# Patient Record
Sex: MALE | Race: WHITE | Employment: FULL TIME | URBAN - METROPOLITAN AREA
[De-identification: names, ages, dates, MRNs, and addresses within clinical notes are randomized per-mention and may not be internally consistent; named-entity substitution may affect disease eponyms.]

---

## 2022-08-22 NOTE — PROGRESS NOTES
Neurology Note    Patient ID:  Jarett Murillo  [id number]  64 y.o.  1960      Date of Consultation:  August 23, 2022    Referring Physician: Dr. Tony Patton    Reason for Consultation: Numbness and weakness      Assessment and Plan:      The patient is a pleasant 44-year-old gentleman who presents to the neurology clinic with sensory disturbance in his distal lower extremity. His examination does reveal evidence of a mild length dependent peripheral neuropathy. Peripheral neuropathy:  The differential includes idiopathic, inflammatory, infectious, metabolic. This very well may be a predominant small fiber neuropathy. I was able to review basic laboratory results performed at [facility]. I have reached out to his primary care doctor to see what blood tests have been obtained within the last 6 months and whether additional testing is necessary. Laboratory results which will need to be tested including updated glucose screen, vitamin B12, serum and fixation. He does report to having elevated cholesterol in the past.  We talked about the role of dyslipidemia can pay him neuropathic symptoms. He will follow-up with his primary care doctor in regards to this    I will obtain an EMG/nerve conduction study to determine the severity of the neuropathy and nerve type involved. We did discuss his role as a  and potential exposure to different types of solvents. He reports a low level of exposure . Will follow clinically for the time being. Neuropathy:  we reviewed the causes contributing to the neuropathy. We discussed the importance of diet, exercise and activity. Neuropathic pain:  He continues with gabapentin at bedtime which seems to be helping his symptoms      Prior lyme disease: previously treated with antibiotics.            Subjective: I have numbness in my feet       History of Present Illness:   Jarett Murillo is a 64 y.o. male who was referred to the neurology clinic at [facility] for an evaluation. He was referred due to numbness in his feet and to a lesser extent his hands. She presents with his wife today who provides additional information. The patient stated that he began to notice symptoms in his feet around mid June 2022. He states that it started in both feet. It was slightly worse on the left. He did notice mild swelling of his left foot. This was just on the left and was not noted on the right. He did ultimately have an ultrasound done which revealed no evidence of a blood clot. He describes his symptoms as both a combination of tingling and numbness in his feet. It has been fairly constant since then. He ultimately needed to go to [facility] on June 28 due to symptoms persisting. It was at that time they started him on low-dose gabapentin 300 mg at bedtime. This has made him gradually feel better. He has no more tingling. He still does have the numbness and the coldness in his feet. He does not notice any balance difficulties. Prior to the onset of these symptoms, There was no new medications that were started. He does work as a  and has not had any specific chemical exposure but did want to mention this. He also has not had COVID but is concerned about possible post long-haul COVID symptoms and could that be associated with his sensory symptoms in his feet. We discussed the unlikeliness of the visit as there was no clear evidence of COVID. The patient did bring lab results from mid-to-late June at both [facility] and [facility].   The CBC was normal.  LFTs normal.  Creatinine normal he also did have a chest x-ray which was normal.  TSH was normal.    The patient does state he has had high cholesterol in the past but does not remember what his level was. He also reports that he has had Lyme disease a couple times and has taken antibiotics.     Other items that him or his wife wanted to mention that occasionally he does have some mild tremor in his hands. Is most notable when he is carrying something heavy but is not a concern for him. He also does have a longstanding history of chronic headaches and will get a rare migraine. If he takes ibuprofen and lays down this typically helps it go away. Past Medical History:   Diagnosis Date    Hx of appendectomy     2010    Neuropathy June 15th, 2022    BPVertigo - one year ago. Past Surgical History:   Procedure Laterality Date    HX HERNIA REPAIR      x2        Family History   Problem Relation Age of Onset    Migraines Mother     Dementia Father     Cancer Sister         Social History     Tobacco Use    Smoking status: Never    Smokeless tobacco: Never   Substance Use Topics    Alcohol use: No        No Known Allergies     Prior to Admission medications    Medication Sig Start Date End Date Taking?  Authorizing Provider   gabapentin (NEURONTIN) 300 mg capsule  7/26/22  Yes Provider, Historical       Review of Systems:    General, constitutional: negative  Eyes, vision: negative  Ears, nose, throat: negative  Cardiovascular, heart: negative  Respiratory: negative  Gastrointestinal: negative  Genitourinary: negative  Musculoskeletal: negative  Skin and integumentary: negative  Psychiatric: negative  Endocrine: negative  Neurological: negative, except for HPI  Hematologic/lymphatic: negative  Allergy/immunology: negative      Objective:     Visit Vitals  BP 96/78 (BP 1 Location: Left upper arm, BP Patient Position: Sitting, BP Cuff Size: Large adult)   Pulse 63   Resp 16   Ht 5' 11\" (1.803 m)   Wt 166 lb 3.2 oz (75.4 kg)   SpO2 98%   BMI 23.18 kg/m²       Physical Exam:      General:  appears well nourished in no acute distress  Neck: no carotid bruits  Lungs: clear to auscultation  Heart:  no murmurs, regular rate  Lower extremity: peripheral pulses palpable and no edema  Skin: intact    Neurological exam:    Awake, alert, oriented to person, place and time  Recent and remote memory were normal  Attention and concentration were intact  Language was intact. There was no aphasia  Speech: no dysarthria  Fund of knowledge was preserved    Cranial nerves:   II-XII were tested    Perrrla  Fundoscopic examination revealed venous pulsations and no clear abnormalities  Visual fields were full  Eomi, no evidence of nystagmus  Facial sensation:  normal and symmetric  Facial motor: normal and symmetric  Hearing intact  SCM strength intact  Tongue: midline without fasciculations    Motor: Tone normal-there was no cogwheel rigidity  Pronator drift was absent  No evidence of fasciculations    Strength testing:   deltoid triceps biceps Wrist ext. Wrist flex. intrinsics Hip flex. Hip ext. Knee ext. Knee flex Dorsi flex Plantar flex   Right 5 5 5 5 5 5 5 5 5 5 5 5   Left 5 5 5 5 5 5 5 5 5 5 5 5         Sensory:  Upper extremity: intact to pp, light touch, and vibration > 10 seconds  Lower extremity: intact to pp and light touch. Vibration was present for 4 seconds at his toes and 8 seconds at his ankles    Reflexes:    Right Left  Biceps  2 2  Triceps     2 2  Brachiorad. 2 2  Patella  2 2  Achilles 2 2    Plantar response:  flexor bilaterally      Cerebellar testing:  no tremor apparent, finger/nose and ciera were intact    Romberg: absent    Gait: steady. Heel, toe, and tandem gait were normal.    Labs:     Lab Results   Component Value Date/Time    Sodium 140 06/13/2015 09:58 PM    Potassium 3.8 06/13/2015 09:58 PM    Chloride 106 06/13/2015 09:58 PM    Glucose 96 06/13/2015 09:58 PM    BUN 16 06/13/2015 09:58 PM    Creatinine 1.02 06/13/2015 09:58 PM    Calcium 8.3 (L) 06/13/2015 09:58 PM    WBC 5.8 06/13/2015 09:58 PM    HCT 42.8 06/13/2015 09:58 PM    HGB 15.1 06/13/2015 09:58 PM    PLATELET 327 50/35/8036 09:58 PM       Imaging:    No results found for this or any previous visit.       Results from [place] encounter on 07/28/10    CT PELVIS WITH CONTRAST    Narrative    ICD Codes / Adm. Diagnosis: 937740   / Abdominal Pain  Examination:  PELVIS W CON  - [id number] - Jul 29 2010 12:57AM  Accession No:  [id number]  Reason:  r/o appendicitis rlq pain      REPORT:  INDICATION: See indication above. COMPARISON: None. TECHNIQUE:  Multislice helical CT was performed from the diaphragm to the symphysis  pubis during uneventful rapid bolus intravenous administration of 100 mL of  Optiray 350. Oral contrast was also administered. Delayed images of the  abdomen were acquired. Contiguous 5 mm axial images were reconstructed and  lung and soft tissue windows were generated. Coronal reformations were  generated. FINDINGS:  There is minimal atelectasis the lung bases. There is a 5 mm low density in the liver too small to characterize but may  represent a tiny cyst. The spleen and pancreas are within normal limits. No  retroperitoneal adenopathy is identified. There is a 1 cm and 0.7 cm  low-density in the left kidney most likely small cyst. No bowel dilatation  is identified. The appendix is thickened and measures 1 cm with slight  periappendiceal inflammation suspicious for early appendicitis. They are are  diverticula in in the sigmoid colon. IMPRESSION:  1. Findings are suspicious for early appendicitis. Results called to the ER. Interpreting/Reading Doctor: Nhung Cash ([id number])  Transcribed: n/a on 07/29/2010  Approved: Nhung Cash ([id number])  07/29/2010        Distribution:             There is no problem list on file for this patient.        The patient should return to clinic for the EMG/nerve conduction study    Renewed medication: None at this time    I spent   60 minutes on the day of the encounter preparing the office visit by reviewing medical records, obtaining a history, performing examination, counseling and educating the patient and family members on diagnosis, ordering tests, documenting in the clinical medical record, and coordinating the care for the patient. The patient had the ability to ask questions and all questions were answered. Addendum on August 24, 2022. I did receive blood work from his primary care doctor that was performed on July 21, 2022. His LDL was 123.   Thyroid testing, CBC and comprehensive metabolic panel were normal.  Additional blood work was now ordered      Signed By:  Valeria Nina DO FAAN    August 23, 2022

## 2022-08-23 ENCOUNTER — TELEPHONE (OUTPATIENT)
Dept: NEUROLOGY | Age: 62
End: 2022-08-23

## 2022-08-23 ENCOUNTER — OFFICE VISIT (OUTPATIENT)
Dept: NEUROLOGY | Age: 62
End: 2022-08-23
Payer: COMMERCIAL

## 2022-08-23 VITALS
HEIGHT: 71 IN | BODY MASS INDEX: 23.27 KG/M2 | SYSTOLIC BLOOD PRESSURE: 96 MMHG | OXYGEN SATURATION: 98 % | RESPIRATION RATE: 16 BRPM | WEIGHT: 166.2 LBS | DIASTOLIC BLOOD PRESSURE: 78 MMHG | HEART RATE: 63 BPM

## 2022-08-23 DIAGNOSIS — G60.9 IDIOPATHIC PERIPHERAL NEUROPATHY: Primary | ICD-10-CM

## 2022-08-23 DIAGNOSIS — M79.2 NEUROPATHIC PAIN: ICD-10-CM

## 2022-08-23 DIAGNOSIS — G62.9 NEUROPATHY: ICD-10-CM

## 2022-08-23 PROCEDURE — 99205 OFFICE O/P NEW HI 60 MIN: CPT | Performed by: PSYCHIATRY & NEUROLOGY

## 2022-08-23 RX ORDER — GABAPENTIN 300 MG/1
CAPSULE ORAL
COMMUNITY
Start: 2022-07-26 | End: 2022-09-01 | Stop reason: SDUPTHER

## 2022-08-23 NOTE — LETTER
8/23/2022    Patient: Creed Cooks   YOB: 1960   Date of Visit: 8/23/2022     Isamar Molina MD  [address] [place] [address] [address] [place] [address] [phone]  Via Fax: [phone]    Dear Isamar Molina MD,      Thank you for referring Mr. Creed Cooks to [address] [place] [address] for evaluation. My notes for this consultation are attached. If you have questions, please do not hesitate to call me. I look forward to following your patient along with you.       Sincerely,    Дмитрий Fernandez, DO

## 2022-08-24 ENCOUNTER — TELEPHONE (OUTPATIENT)
Dept: NEUROLOGY | Age: 62
End: 2022-08-24

## 2022-08-24 NOTE — TELEPHONE ENCOUNTER
Attempted to contact patient again to schedule EMG. LVM another voicemail for patient to contact office. Patient with only one phone number on file.

## 2022-09-01 ENCOUNTER — OFFICE VISIT (OUTPATIENT)
Dept: NEUROLOGY | Age: 62
End: 2022-09-01
Payer: COMMERCIAL

## 2022-09-01 DIAGNOSIS — R20.0 NUMBNESS AND TINGLING OF BOTH FEET: ICD-10-CM

## 2022-09-01 DIAGNOSIS — G60.9 IDIOPATHIC SMALL FIBER PERIPHERAL NEUROPATHY: ICD-10-CM

## 2022-09-01 DIAGNOSIS — G62.9 NEUROPATHY: Primary | ICD-10-CM

## 2022-09-01 DIAGNOSIS — G60.9 IDIOPATHIC PERIPHERAL NEUROPATHY: ICD-10-CM

## 2022-09-01 DIAGNOSIS — R20.2 NUMBNESS AND TINGLING OF BOTH FEET: ICD-10-CM

## 2022-09-01 PROCEDURE — 95886 MUSC TEST DONE W/N TEST COMP: CPT | Performed by: PSYCHIATRY & NEUROLOGY

## 2022-09-01 PROCEDURE — 95910 NRV CNDJ TEST 7-8 STUDIES: CPT | Performed by: PSYCHIATRY & NEUROLOGY

## 2022-09-01 NOTE — PROCEDURES
ELECTRODIAGNOSTIC REPORT      Test Date:  2022    Patient: Malini Tinajero : 1960 Physician: Dr. Arpita Gordon   ID#: [id number] SEX: Male Ref. Phys: Dr. Arpita Gordon     Patient History / Exam:  The patient is a pleasant 77-year-old gentleman who presents with sensory disturbance in his distal bilateral lower extremity. Examination does reveal mild decrease to pinprick. He has 5 out of 5 strength and normal gait. EMG & NCV Findings:  Nerve conduction studies as listed below in the bilateral lower extremities was normal.    Disposable concentric needle examination of the muscles listed below in the left lower extremity was normal.    Impression: This study was normal.  There is no electrodiagnostic evidence upon today's examination suggesting a focal or generalized large fiber neuropathy, myopathy, or lumbar radiculopathy. Given his clinical history, examination and these findings, this would be most consistent with a small fiber neuropathy. Clinical correlation is recommended. The patient was sent for additional serological testings after his visit today.      ___________________________  Nirav DE LUNA  FAAN    Nerve Conduction Studies  Anti Sensory Summary Table     Stim Site NR Onset (ms) Peak (ms) O-P Amp (µV) Norm Peak (ms) Norm O-P Amp Site1 Site2 Dist (cm) Norm Baudiilo (m/s)   Left Sup Fibular Anti Sensory (Lat ankle)  33.6°C   Lower leg    1.7 2.3 15.4 <4.4 >5.0 Lower leg Lat ankle 10.0 >32   Right Sup Fibular Anti Sensory (Lat ankle)  33.6°C   Lower leg    1.4 2.4 18.6 <4.4 >5.0 Lower leg Lat ankle 10.0 >32   Left Sural Anti Sensory (Lat Mall)  33.6°C   Calf    1.7 2.5 10.5 <4.5 >4.0 Calf Lat Mall 14.0    Right Sural Anti Sensory (Lat Mall)  33.6°C   Calf    1.4 2.0 19.6 <4.5 >4.0 Calf Lat Mall 14.0      Motor Summary Table     Stim Site NR Onset (ms) Norm Onset (ms) O-P Amp (mV) Norm O-P Amp P-T Amp (mV) Site1 Site2 Dist (cm) Baudilio (m/s)   Left Fibular Motor (Ext Dig Brev)  33.6°C   Ankle 4. 6 <6.5 4.4 >1.1  Ankle Ext Dig Brev 8.0 17   B Fib    11.7  3.7   B Fib Ankle 31.5 44   Poplt    13.9  3.7   Poplt B Fib 10.0 45   Right Fibular Motor (Ext Dig Brev)  33.6°C   Ankle    3.9 <6.5 5.4 >1.1  Ankle Ext Dig Brev 8.0 21   B Fib    11.2  5.3   B Fib Ankle 34.0 47   Poplt    13.4  5.3   Poplt B Fib 10.0 45   Left Tibial Motor (Abd Mccormack Brev)  33.6°C   Ankle    4.4 <6.1 7.6 >4.4  Ankle Abd Mccormack Brev 8.0 18   Knee    13.0  7.6   Knee Ankle 42.0 49   Right Tibial Motor (Abd Mccormack Brev)  33.6°C   Ankle    3.8 <6.1 9.9 >4.4  Ankle Abd Mccormack Brev 8.0 21   Knee    11.9  8.0   Knee Ankle 42.0 52       EMG     Side Muscle Nerve Root Ins Act Fibs Psw Recrt Duration Amp Poly Comment   Left AntTibialis Dp Br Peron L4-5 Nml Nml Nml Nml Nml Nml Nml    Left MedGastroc Tibial S1-2 Nml Nml Nml Nml Nml Nml Nml    Left Peroneus Long   Nml Nml Nml Nml Nml Nml Nml    Left VastusLat Femoral L2-4 Nml Nml Nml Nml Nml Nml Nml    Left Semitendinosus Sciatic L5-S2 Nml Nml Nml Nml Nml Nml Nml      Waveforms:

## 2022-11-18 LAB
ALBUMIN SERPL-MCNC: 4.7 G/DL (ref 3.8–4.8)
ALBUMIN/GLOB SERPL: 2.5 {RATIO} (ref 1.2–2.2)
ALP SERPL-CCNC: 59 IU/L (ref 44–121)
ALT SERPL-CCNC: 16 IU/L (ref 0–44)
AST SERPL-CCNC: 18 IU/L (ref 0–40)
BILIRUB SERPL-MCNC: 0.6 MG/DL (ref 0–1.2)
BUN SERPL-MCNC: 18 MG/DL (ref 8–27)
BUN/CREAT SERPL: 17 (ref 10–24)
CALCIUM SERPL-MCNC: 9.3 MG/DL (ref 8.6–10.2)
CHLORIDE SERPL-SCNC: 106 MMOL/L (ref 96–106)
CO2 SERPL-SCNC: 23 MMOL/L (ref 20–29)
CREAT SERPL-MCNC: 1.08 MG/DL (ref 0.76–1.27)
EGFR: 78 ML/MIN/1.73
GLOBULIN SER CALC-MCNC: 1.9 G/DL (ref 1.5–4.5)
GLUCOSE SERPL-MCNC: 95 MG/DL (ref 70–99)
IGA SERPL-MCNC: 153 MG/DL (ref 61–437)
IGG SERPL-MCNC: 982 MG/DL (ref 603–1613)
IGM SERPL-MCNC: 100 MG/DL (ref 20–172)
POTASSIUM SERPL-SCNC: 4.4 MMOL/L (ref 3.5–5.2)
PROT PATTERN SERPL IFE-IMP: NORMAL
PROT SERPL-MCNC: 6.6 G/DL (ref 6–8.5)
SODIUM SERPL-SCNC: 141 MMOL/L (ref 134–144)
VIT B12 SERPL-MCNC: 325 PG/ML (ref 232–1245)

## 2022-11-21 DIAGNOSIS — G60.9 IDIOPATHIC SMALL FIBER PERIPHERAL NEUROPATHY: Primary | ICD-10-CM

## 2022-12-24 ENCOUNTER — PATIENT MESSAGE (OUTPATIENT)
Dept: NEUROLOGY | Age: 62
End: 2022-12-24

## 2022-12-24 DIAGNOSIS — G60.9 IDIOPATHIC PERIPHERAL NEUROPATHY: ICD-10-CM

## 2022-12-27 RX ORDER — GABAPENTIN 300 MG/1
300 CAPSULE ORAL
Qty: 60 CAPSULE | Refills: 0 | Status: SHIPPED | OUTPATIENT
Start: 2022-12-27

## 2022-12-27 NOTE — TELEPHONE ENCOUNTER
Last OV was 8/23/22  patient requesting a two month supply on gabapetin. [place] of [place] and half of February. Currently has a refill for a 30 day supply.

## 2023-02-13 ENCOUNTER — TELEPHONE (OUTPATIENT)
Dept: NEUROLOGY | Age: 63
End: 2023-02-13

## 2023-02-13 NOTE — TELEPHONE ENCOUNTER
Pt states [facility] called him on Friday and was awaiting his return call about an appt with Dr. Eliseo Carroll. No notes in system about call.  Please call work number [phone]

## 2023-03-01 NOTE — PROGRESS NOTES
Neurology Note    Patient ID:  Breezy Gale  [id number]  58 y.o.  1960      Date of Consultation:  March 3, 2023        Assessment and Plan:       The patient is a pleasant 80-year-old gentleman who returns to the neurology clinic with sensory disturbance in his distal lower extremity. He did have an extensive work-up including an EMG/nerve conduction study and serology. This was felt to be most likely a small fiber neuropathy. Idiopathic Small fiber neuropathy:  Improving symptoms. His examination has improved since his last visit. Given the improvement in his examination, we will hold off on additional testing including a skin biopsy. I discussed the rationale of this with the patient today and he is in agreement with plan. Neuropathic pain:  He does continue with gabapentin 300 mg at bedtime. He will try to wean himself off the medication. If his symptoms do worsen, he will restart the medication. Subjective: My numbness and tingling has improved. History of Present Illness:   Breezy Gale is a 58 y.o. male with sensory disturbance, numbness and tingling in his bilateral lower extremities. He did have an EMG/nerve conduction study performed in September 2022 which revealed no large fiber neuropathy. He was thus referred for a skin biopsy for possible small fiber neuropathy. Since this visit was scheduled in December, he does feel that his symptoms have improved. He notices less numbness and tingling. He does continue to be active. 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     He denies any new symptoms    Past Medical History:   Diagnosis Date    Hx of appendectomy     2010    Neuropathy June 15th, 2022        Past Surgical History:   Procedure Laterality Date    HX HERNIA REPAIR      x2        Family History   Problem Relation Age of Onset    Migraines Mother     Dementia Father     Cancer Sister         Social History     Tobacco Use    Smoking status: Never    Smokeless tobacco: Never   Substance Use Topics    Alcohol use: No        No Known Allergies     Prior to Admission medications    Medication Sig Start Date End Date Taking? Authorizing Provider   gabapentin (NEURONTIN) 300 mg capsule Take 1 Capsule by mouth nightly. Max Daily Amount: 300 mg. 12/27/22   Дмитрий Fernandez DO       Review of Systems:    General, constitutional: negative  Eyes, vision: negative  Ears, nose, throat: negative  Cardiovascular, heart: negative  Respiratory: negative  Gastrointestinal: negative  Genitourinary: negative  Musculoskeletal: negative  Skin and integumentary: negative  Psychiatric: negative  Endocrine: negative  Neurological: negative, except for HPI  Hematologic/lymphatic: negative  Allergy/immunology: negative      Objective:     Visit Vitals  /72 (BP 1 Location: Left upper arm, BP Patient Position: Sitting, BP Cuff Size: Large adult)   Pulse 64   Resp 15   Ht 5' 11\" (1.803 m)   Wt 166 lb (75.3 kg)   SpO2 98%   BMI 23.15 kg/m²       Physical Exam:    Neurological examination    General:  appears in no acute distress  Neck: no carotid bruits  Lungs: clear to auscultation  Heart:  no murmurs, regular rate  Lower extremity: no edema  Skin: intact    Awake, alert, oriented to person, place and time    Language was intact. There was no aphasia or dysarthria    Cranial nerves:   II-XII were tested    Perrrla   Visual fields were full  Facial motor: normal and symmetric  Hearing intact    Motor: Tone normal    No evidence of fasciculations    Strength testing:   deltoid triceps biceps Wrist ext. Wrist flex. intrinsics Hip flex. Hip ext. Knee ext. Knee flex Dorsi flex Plantar flex   Right 5 5 5 5 5 5 5 5 5 5 5 5   Left 5 5 5 5 5 5 5 5 5 5 5 5       Sensory:  Upper extremity: intact to pp, light touch, and vibration > 10 seconds  Lower extremity: intact to pp. Vibration was 8 seconds at his toes today. This has improved since his last visit. Gait: steady. Labs:     Lab Results   Component Value Date/Time    Sodium 141 11/12/2022 11:31 AM    Potassium 4.4 11/12/2022 11:31 AM    Chloride 106 11/12/2022 11:31 AM    Glucose 95 11/12/2022 11:31 AM    BUN 18 11/12/2022 11:31 AM    Creatinine 1.08 11/12/2022 11:31 AM    Calcium 9.3 11/12/2022 11:31 AM    WBC 5.8 06/13/2015 09:58 PM    HCT 42.8 06/13/2015 09:58 PM    HGB 15.1 06/13/2015 09:58 PM    PLATELET 675 08/82/8190 09:58 PM       Imaging:    No results found for this or any previous visit. Results from [place] encounter on 07/28/10    CT PELVIS WITH CONTRAST    Narrative    ICD Codes / Adm. Diagnosis: 114118   / Abdominal Pain  Examination:  PELVIS W CON  - [id number] - Jul 29 2010 12:57AM  Accession No:  [id number]  Reason:  r/o appendicitis rlq pain      REPORT:  INDICATION: See indication above. COMPARISON: None. TECHNIQUE:  Multislice helical CT was performed from the diaphragm to the symphysis  pubis during uneventful rapid bolus intravenous administration of 100 mL of  Optiray 350. Oral contrast was also administered. Delayed images of the  abdomen were acquired. Contiguous 5 mm axial images were reconstructed and  lung and soft tissue windows were generated. Coronal reformations were  generated. FINDINGS:  There is minimal atelectasis the lung bases. There is a 5 mm low density in the liver too small to characterize but may  represent a tiny cyst. The spleen and pancreas are within normal limits. No  retroperitoneal adenopathy is identified. There is a 1 cm and 0.7 cm  low-density in the left kidney most likely small cyst. No bowel dilatation  is identified. The appendix is thickened and measures 1 cm with slight  periappendiceal inflammation suspicious for early appendicitis. They are are  diverticula in in the sigmoid colon. IMPRESSION:  1. Findings are suspicious for early appendicitis.  Results called to the ER.        Interpreting/Reading Doctor: Benitez Ortega ([id number])  Transcribed: n/a on 07/29/2010  Approved: Benitez Ortega ([id number])  07/29/2010        Distribution:             There is no problem list on file for this patient. The patient should return to clinic as needed    Renewed medication: none today    I spent   25  minutes on the day of the encounter preparing the office visit by reviewing medical records, obtaining a history, performing examination, counseling and educating the patient on diagnosis, documenting in the clinical medical record, and coordinating the care for the patient. The patient had the ability to ask questions and all questions were answered.                 Signed By:  Jazlyn Hussein DO FAAN    March 3, 2023

## 2023-03-03 ENCOUNTER — OFFICE VISIT (OUTPATIENT)
Dept: NEUROLOGY | Age: 63
End: 2023-03-03

## 2023-03-03 VITALS
HEIGHT: 71 IN | RESPIRATION RATE: 15 BRPM | DIASTOLIC BLOOD PRESSURE: 72 MMHG | HEART RATE: 64 BPM | WEIGHT: 166 LBS | BODY MASS INDEX: 23.24 KG/M2 | OXYGEN SATURATION: 98 % | SYSTOLIC BLOOD PRESSURE: 102 MMHG

## 2023-03-03 DIAGNOSIS — G60.9 IDIOPATHIC SMALL FIBER PERIPHERAL NEUROPATHY: Primary | ICD-10-CM

## 2023-03-03 NOTE — LETTER
3/3/2023    Patient: Lolita Staton   YOB: 1960   Date of Visit: 3/3/2023     Brandie Campo MD  [facility] [id number]  Via Fax: [phone]    Dear Brandie Campo MD,      Thank you for referring Mr. Lolita Staton to [address] [place] [address] for evaluation. My notes for this consultation are attached. If you have questions, please do not hesitate to call me. I look forward to following your patient along with you.       Sincerely,    Дмитрий Fernandez, DO

## 2025-03-17 ENCOUNTER — APPOINTMENT (OUTPATIENT)
Facility: HOSPITAL | Age: 65
End: 2025-03-17
Payer: COMMERCIAL

## 2025-03-17 ENCOUNTER — HOSPITAL ENCOUNTER (EMERGENCY)
Facility: HOSPITAL | Age: 65
Discharge: HOME | End: 2025-03-17
Attending: STUDENT IN AN ORGANIZED HEALTH CARE EDUCATION/TRAINING PROGRAM
Payer: COMMERCIAL

## 2025-03-17 VITALS
HEIGHT: 71 IN | RESPIRATION RATE: 17 BRPM | WEIGHT: 153.88 LBS | HEART RATE: 58 BPM | DIASTOLIC BLOOD PRESSURE: 66 MMHG | BODY MASS INDEX: 21.54 KG/M2 | OXYGEN SATURATION: 97 % | SYSTOLIC BLOOD PRESSURE: 113 MMHG | TEMPERATURE: 97.5 F

## 2025-03-17 DIAGNOSIS — R07.89 OTHER CHEST PAIN: Primary | ICD-10-CM

## 2025-03-17 LAB
ALBUMIN SERPL-MCNC: 4.1 G/DL (ref 3.5–5)
ALBUMIN/GLOB SERPL: 1.3 (ref 1.1–2.2)
ALP SERPL-CCNC: 55 U/L (ref 45–117)
ALT SERPL-CCNC: 22 U/L (ref 12–78)
ANION GAP SERPL CALC-SCNC: 4 MMOL/L (ref 2–12)
AST SERPL-CCNC: 15 U/L (ref 15–37)
BASOPHILS # BLD: 0.07 K/UL (ref 0–0.1)
BASOPHILS NFR BLD: 1.1 % (ref 0–1)
BILIRUB SERPL-MCNC: 0.9 MG/DL (ref 0.2–1)
BUN SERPL-MCNC: 13 MG/DL (ref 6–20)
BUN/CREAT SERPL: 12 (ref 12–20)
CALCIUM SERPL-MCNC: 9.1 MG/DL (ref 8.5–10.1)
CHLORIDE SERPL-SCNC: 106 MMOL/L (ref 97–108)
CO2 SERPL-SCNC: 30 MMOL/L (ref 21–32)
COMMENT:: NORMAL
CREAT SERPL-MCNC: 1.12 MG/DL (ref 0.7–1.3)
DIFFERENTIAL METHOD BLD: ABNORMAL
EOSINOPHIL # BLD: 0.01 K/UL (ref 0–0.4)
EOSINOPHIL NFR BLD: 0.2 % (ref 0–7)
ERYTHROCYTE [DISTWIDTH] IN BLOOD BY AUTOMATED COUNT: 12.6 % (ref 11.5–14.5)
FLUAV RNA SPEC QL NAA+PROBE: NOT DETECTED
FLUBV RNA SPEC QL NAA+PROBE: NOT DETECTED
GLOBULIN SER CALC-MCNC: 3.2 G/DL (ref 2–4)
GLUCOSE SERPL-MCNC: 101 MG/DL (ref 65–100)
HCT VFR BLD AUTO: 46.4 % (ref 36.6–50.3)
HGB BLD-MCNC: 15.8 G/DL (ref 12.1–17)
IMM GRANULOCYTES # BLD AUTO: 0.01 K/UL (ref 0–0.04)
IMM GRANULOCYTES NFR BLD AUTO: 0.2 % (ref 0–0.5)
LYMPHOCYTES # BLD: 1.61 K/UL (ref 0.8–3.5)
LYMPHOCYTES NFR BLD: 24.9 % (ref 12–49)
MCH RBC QN AUTO: 31.9 PG (ref 26–34)
MCHC RBC AUTO-ENTMCNC: 34.1 G/DL (ref 30–36.5)
MCV RBC AUTO: 93.7 FL (ref 80–99)
MONOCYTES # BLD: 0.57 K/UL (ref 0–1)
MONOCYTES NFR BLD: 8.8 % (ref 5–13)
NEUTS SEG # BLD: 4.2 K/UL (ref 1.8–8)
NEUTS SEG NFR BLD: 64.8 % (ref 32–75)
NRBC # BLD: 0 K/UL (ref 0–0.01)
NRBC BLD-RTO: 0 PER 100 WBC
PLATELET # BLD AUTO: 253 K/UL (ref 150–400)
PMV BLD AUTO: 9.7 FL (ref 8.9–12.9)
POTASSIUM SERPL-SCNC: 3.9 MMOL/L (ref 3.5–5.1)
PROT SERPL-MCNC: 7.3 G/DL (ref 6.4–8.2)
RBC # BLD AUTO: 4.95 M/UL (ref 4.1–5.7)
SARS-COV-2 RNA RESP QL NAA+PROBE: NOT DETECTED
SODIUM SERPL-SCNC: 140 MMOL/L (ref 136–145)
SOURCE: NORMAL
SPECIMEN HOLD: NORMAL
TROPONIN I SERPL HS-MCNC: 8 NG/L (ref 0–76)
TROPONIN I SERPL HS-MCNC: 8 NG/L (ref 0–76)
WBC # BLD AUTO: 6.5 K/UL (ref 4.1–11.1)

## 2025-03-17 PROCEDURE — 93005 ELECTROCARDIOGRAM TRACING: CPT | Performed by: STUDENT IN AN ORGANIZED HEALTH CARE EDUCATION/TRAINING PROGRAM

## 2025-03-17 PROCEDURE — 85025 COMPLETE CBC W/AUTO DIFF WBC: CPT

## 2025-03-17 PROCEDURE — 84484 ASSAY OF TROPONIN QUANT: CPT

## 2025-03-17 PROCEDURE — 80053 COMPREHEN METABOLIC PANEL: CPT

## 2025-03-17 PROCEDURE — 99285 EMERGENCY DEPT VISIT HI MDM: CPT

## 2025-03-17 PROCEDURE — 36415 COLL VENOUS BLD VENIPUNCTURE: CPT

## 2025-03-17 PROCEDURE — 87636 SARSCOV2 & INF A&B AMP PRB: CPT

## 2025-03-17 PROCEDURE — 71045 X-RAY EXAM CHEST 1 VIEW: CPT

## 2025-03-17 RX ORDER — FAMOTIDINE 20 MG/1
20 TABLET, FILM COATED ORAL DAILY PRN
Qty: 30 TABLET | Refills: 0 | Status: SHIPPED | OUTPATIENT
Start: 2025-03-17 | End: 2025-03-17

## 2025-03-17 RX ORDER — FAMOTIDINE 20 MG/1
20 TABLET, FILM COATED ORAL DAILY PRN
Qty: 30 TABLET | Refills: 0 | Status: SHIPPED | OUTPATIENT
Start: 2025-03-17 | End: 2025-04-16

## 2025-03-17 ASSESSMENT — PAIN SCALES - GENERAL: PAINLEVEL_OUTOF10: 0

## 2025-03-17 NOTE — ED PROVIDER NOTES
below the reference range, CBC is normal and shows normal hemoglobin, hematocrit, platelets, and white blood cells     Imaging interpretation by ER physician: CXR is normal without any acute findings    Previous documentation reviewed includes Previous medical records, Previous blood work and imaging, Previous and current medications as available, Previous hospitalizations, physician's notes, consultant notes, and Current nursing notes which have helped provide insight into patient's medical history, current medical condition, and help guide patient's medical care during today's visit        Records Reviewed (source and summary of external notes): Prior medical records and Nursing notes.    Vitals:    Vitals:    03/17/25 1009 03/17/25 1030 03/17/25 1045 03/17/25 1100   BP: 125/65 111/64 106/68 102/67   Pulse: 64 65 64 61   Resp: 16 12 13 15   Temp: 97.5 °F (36.4 °C)      SpO2: 100% 97% 97% 98%   Weight: 69.8 kg (153 lb 14.1 oz)      Height: 1.803 m (5' 11\")           ED COURSE  ED Course as of 03/17/25 1326   Mon Mar 17, 2025   1054 EKG shows normal sinus rhythm, normal rate, heart rate 66, no STEMI    Interpreted by ER attending [AH]   1055 Cardiac Monitor:   Rate: 64 bpm  The cardiac monitor revealed the following rhythm as interpreted by me: Normal Sinus Rhythm  Cardiac and pulse ox monitoring were ordered to monitor patient for signs of cardiac dysrhythmia, which they are at risk for based on their history and/or risk for cardiovascular disease and/or metabolic abnormalities.   Pulse ox monitoring: normal oxygenation, reading 100    Interpretations by ER physician     [AH]      ED Course User Index  [] Hamzah Schroeder MD     Patient feeling better after evaluation in the Emergency Department.  I offered medication for GERD such as famotidine or GI cocktail with patient reports he is feeling much better since arriving the ER and had no further pain since being in the ER.  Troponin negative x 2.  Discussed

## 2025-03-17 NOTE — DISCHARGE INSTRUCTIONS
It was a please treating you today Thaddeus Hoang. You were evaluated for chest pain in the Emergency Department today. Your lab work (cardiac enzymes), chest x-ray, and EKG were within normal limits. There was no evidence you were having an acute cardiac event today (such as a heart attack), but I would like you to follow up with cardiology for further evaluation. Please schedule an appointment to be seen in the next week for re-evaluation and continued care. Some forms of early heart disease would not be detected on your visit today and you may require a stress test for further evaluation if you have not had one before. A cardiologist can also discuss the best heart, blood pressure, and cholestrol medication to be on to help reduce your risk factors for any cardiac events. While your work up was reassuring today, you may still have a cardiac event in the future. If you have any worsening chest pain, chest pressure, shortness of breath, difficulty breathing, or any concern you may be having a heart attack you must return to the Emergency Department or call 911 immediately.         Thank you for choosing our Emergency Department for your care.  It is our privilege to care for you in your time of need.  In the next several days, you may receive a survey via email or mailed to your home about your experience with our team.  We would greatly appreciate you taking a few minutes to complete the survey, as we use this information to learn what we have done well and what we could be doing better. Thank you for trusting us with your care!    Below you will find a list of your tests from today's visit.   Labs and Radiology Studies  Recent Results (from the past 12 hours)   EKG 12 Lead    Collection Time: 03/17/25 10:05 AM   Result Value Ref Range    Ventricular Rate 66 BPM    Atrial Rate 66 BPM    P-R Interval 146 ms    QRS Duration 86 ms    Q-T Interval 392 ms    QTc Calculation (Bazett) 410 ms    P Axis 85 degrees    R Axis

## 2025-03-18 LAB
EKG ATRIAL RATE: 66 BPM
EKG DIAGNOSIS: NORMAL
EKG P AXIS: 85 DEGREES
EKG P-R INTERVAL: 146 MS
EKG Q-T INTERVAL: 392 MS
EKG QRS DURATION: 86 MS
EKG QTC CALCULATION (BAZETT): 410 MS
EKG R AXIS: 72 DEGREES
EKG T AXIS: 79 DEGREES
EKG VENTRICULAR RATE: 66 BPM